# Patient Record
Sex: MALE
[De-identification: names, ages, dates, MRNs, and addresses within clinical notes are randomized per-mention and may not be internally consistent; named-entity substitution may affect disease eponyms.]

---

## 2019-04-12 ENCOUNTER — HOSPITAL ENCOUNTER (OUTPATIENT)
Dept: HOSPITAL 14 - H.OPSURG | Age: 39
Discharge: HOME | End: 2019-04-12
Attending: PODIATRIST
Payer: COMMERCIAL

## 2019-04-12 VITALS
TEMPERATURE: 97.8 F | SYSTOLIC BLOOD PRESSURE: 128 MMHG | DIASTOLIC BLOOD PRESSURE: 71 MMHG | RESPIRATION RATE: 18 BRPM | HEART RATE: 87 BPM

## 2019-04-12 VITALS — BODY MASS INDEX: 37.8 KG/M2

## 2019-04-12 VITALS — OXYGEN SATURATION: 95 %
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
## 2019-04-12 DIAGNOSIS — E66.01: ICD-10-CM

## 2019-04-12 DIAGNOSIS — I10: ICD-10-CM

## 2019-04-12 DIAGNOSIS — S86.011A: Primary | ICD-10-CM
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
## 2019-04-12 PROCEDURE — 27650 REPAIR ACHILLES TENDON: CPT

## 2019-04-12 PROCEDURE — 93005 ELECTROCARDIOGRAM TRACING: CPT

## 2019-04-12 PROCEDURE — 97530 THERAPEUTIC ACTIVITIES: CPT

## 2019-04-12 PROCEDURE — 97162 PT EVAL MOD COMPLEX 30 MIN: CPT

## 2019-04-12 RX ADMIN — HYDROMORPHONE HYDROCHLORIDE PRN MG: 1 INJECTION, SOLUTION INTRAMUSCULAR; INTRAVENOUS; SUBCUTANEOUS at 15:30

## 2019-04-12 RX ADMIN — HYDROMORPHONE HYDROCHLORIDE PRN MG: 1 INJECTION, SOLUTION INTRAMUSCULAR; INTRAVENOUS; SUBCUTANEOUS at 14:50

## 2019-04-12 RX ADMIN — HYDROMORPHONE HYDROCHLORIDE PRN MG: 1 INJECTION, SOLUTION INTRAMUSCULAR; INTRAVENOUS; SUBCUTANEOUS at 15:20

## 2019-04-12 RX ADMIN — HYDROMORPHONE HYDROCHLORIDE PRN MG: 1 INJECTION, SOLUTION INTRAMUSCULAR; INTRAVENOUS; SUBCUTANEOUS at 15:40

## 2019-04-12 NOTE — PCM.SURG1
Surgeon's Initial Post Op Note





- Surgeon's Notes


Surgeon: Dr. Spangler, DPM


Assistant: Dr. Jerman Sánchez, PGY2


Type of Anesthesia: General Endo


Anesthesia Administered By: Dr. Meadows


Pre-Operative Diagnosis: Ruptured achilles tendon, right


Operative Findings: See dictation report.  M- #2 fiberwire, 2-0 fiberwire, 4-0 

vicryl, 4-0 prolene, Arthrex Arthroflex graft


Post-Operative Diagnosis: Same


Operation Performed: Primary repair of ruptured achilles tendon, right


Specimen/Specimens Removed: None


Estimated Blood Loss: EBL {In ML}: 5


Blood Products Given: N/A


Drains Used: No Drains


Post-Op Condition: Good


Date of Surgery/Procedure: 04/12/19


Time of Surgery/Procedure: 14:40

## 2019-04-12 NOTE — CARD
--------------- APPROVED REPORT --------------





Date of service: 04/12/2019



EKG Measurement

Heart [id number]

MS [id number]

[id number]

[id number]

[id number]



<Conclusion>

Normal sinus rhythm

Inferior infarct, age undetermined

Poor R wave progression in Precordial leads

Abnormal ECG

## 2019-04-12 NOTE — CP.PCM.PN
Subjective





- Date & Time of Evaluation


Date of Evaluation: 04/12/19


Time of Evaluation: 10:53





- Subjective


Subjective: 





Podiatry progress note - Dr. Spangler





38M seen and evaluated in [facility] preoperatively for right achilles repair surgery. 

States that he was playing with his kid on a trampoline and heard a pop and felt

like his ankle cracked. Once he tried to walk he could not bear weight on his 

ankle without extreme pain. States he has been nonweightbearing on the ankle 

since his visit with Dr. Spangler. NPO confirmed. Denies n/v/f/c and has no 

other complaints.





PMHx: denies


PSHx: vasectomy


All: NKDA





Objective





- Vital Signs/Intake and Output


Vital Signs (last 24 hours): 


                                        











Temp Pulse Resp BP Pulse Ox


 


 97.9 F   87   18   137/78   95 


 


 04/12/19 10:42  04/12/19 10:42  04/12/19 10:42  04/12/19 10:42  04/12/19 10:42











- Constitutional


Appears: Non-toxic





- Head Exam


Head Exam: ATRAUMATIC





- Extremities Exam


Additional comments: 





RLE focused exam





VASC: DP and PT pulses palpable; normal vascular exam; edema at the level of the

achilles tendon


DERM: no open lesions or wounds; ecchymosis present


ORTHO: positive maxwell and savage test; pain on palpation of tendon


NEURO: gross and protective sensation intact





- Neurological Exam


Neurological Exam: Alert, Awake, Oriented x3





- Psychiatric Exam


Psychiatric exam: Normal Affect





Assessment and Plan





- Assessment and Plan (Free Text)


Assessment: 





38M with right achilles tendon rupture





Plan: 





Pt was seen and examined in [facility] 


Pt NPO status was confirmed


All pre-op testing and clearance in chart


Pt has exhausted all conservative treatment at this time and is opting for 

surgical intervention


Pt was explained procedure and post-operative course


All pt's questions were answered to satisfaction


No guarantees were made


Pt understands all risks, benefits and complications of procedure


Pt will follow-up with Dr. Spangler within 1 week of surgery

## 2019-04-12 NOTE — CP.SDSHP
Same Day Surgery [facility] & P





- History


Proposed Procedure: R achilles repair


Pre-Op Diagnosis: R achilles rupture





- Allergies


Allergies: 


Allergies





No Known Allergies Allergy (Verified 04/04/19 17:30)


   











- Physical Exam


Vital Signs: 


                                   Vital Signs











  04/12/19





  10:42


 


Temperature 97.9 F


 


Pulse Rate 87


 


Respiratory 18





Rate 


 


Blood Pressure 137/78


 


O2 Sat by Pulse 95





Oximetry 














- Date & Time


Date: 04/12/19


Time: 10:53





Short Stay Discharge





- Short Stay Discharge


Admitting Diagnosis/Reason for Visit: S86.001A


Disposition: HOME/ ROUTINE

## 2019-04-15 NOTE — OP
PROCEDURE DATE:  04/12/2019



PREOPERATIVE DIAGNOSIS:  Partial rupture of right Achilles tendon.



POSTOPERATIVE DIAGNOSIS:  Partial rupture of right Achilles tendon.



PROCEDURE PERFORMED:  Primary repair of partially ruptured right Achilles

tendon.



SURGEON:  Chuck Spangler DPM



ASSISTANT:  Jerman Sánchez DPM, PGY-2



ANESTHESIOLOGIST:  Kary Meadows MD



ANESTHESIA:  General.



INDICATIONS:  The patient is a 38-year-old male with the above-mentioned

diagnosis.  The patient is being treated by Dr. Spangler in his office on

an outpatient basis, where he has exhausted conservative treatment options.

At this time, the patient now seeks surgical intervention.  All risks,

benefits and possible complications of the proposed procedure have been

explained to the patient at length.  The patient verbalized his

understanding and wished to proceed.  All questions were answered.  No

guarantees were given nor implied.  Consent was signed and NPO was

confirmed prior to bringing the patient to the operating room.



DESCRIPTION OF PROCEDURE:  The patient was brought to the operating room

and placed on the operating table in a prone position.  A pneumatic thigh

tourniquet at a pressure setting of 350 mmHg was utilized for the entirety

of the procedure.  Once anesthesia was achieved, the foot was prepped and

draped in the usual sterile manner and the procedure was begun.



Attention was then turned to the posterior aspect of the right leg, where a

palpable dell could be felt overlying the Achilles tendon approximately 5

cm proximal to the Achilles tendon insertion site into the calcaneus. 

Utilizing a fresh #15 blade, a linear longitudinal incision was made over

the palpable dell.  Sharp and blunt dissection was utilized to carry the

dissection down to the level of paratenon with care taken to retract all

vital neurovascular structures and cauterization of all bleeders as

necessary.  Once the paratenon was identified, a linear longitudinal

incision was made through the paratenon using a sharp #15 blade.  Paratenon

was then sharply debrided of the underlying Achilles tendon and flapped

away from the tendon both medially and laterally.  At this time, the

Achilles tendon rupture was identified.  It was noted that approximately

75% of the Achilles tendon diameter was ruptured.  Using both sharp and

blunt dissection, the Achilles tendon was freed from its surrounding soft

tissue structures circumferentially in the area immediately surrounding the

rupture site.  Following this, a #2 FiberWire was utilized via a Krackow

stitch maneuver to reapproximate the distal and proximal ends of the

Achilles tendon in an anatomically appropriate position.  Following this,

the #2 FiberWire was passed from the proximal end of the Achilles tendon

rupture to the distal end of the Achilles tendon rupture and two

over-and-over sutures to further reinforce the reapproximation of the

tendon.  Next, an Arthrex ArthroFLEX graft was circumferentially wrapped

around the ruptured portion of the Achilles tendon and held in place using

2-0 FiberWire suture.  The graft was then sewn down to itself using 2-0

Vicryl suture.  Following this, the paratenon was reapproximated as closely

as could be achieved using 4-0 Vicryl suture.  The surgical site was then

flushed with copious amounts of normal sterile saline.  The deep

subcutaneous layer was reapproximated using 4-0 Vicryl suture and the skin

was reapproximated and well coapted using 4-0 Prolene suture, and the

procedure was completed.  The surgical site was then dressed with

Betadine-soaked Adaptic, 4 x 4 gauze, Kerlix, and a posterior splint was

applied with Webril padding and Ace with care taken to ensure that the

ankle joint was slightly plantar flexed to allow for decreased tension on

the Achilles tendon.



POSTOPERATIVE CONDITION:  The patient tolerated the procedure and the

anesthesia well without any apparent complications or complaints.  The

patient was escorted from the operating room to the recovery room with

vital signs stable and neurovascular structures intact.  The patient will

follow up with Dr. Spangler in his office on an outpatient basis following

discharge from the hospital.





__________________________________________

Jerman Sánchez DPM





__________________________________________

Chuck Spangler DPM





DD:  04/12/2019 19:43:15

DT:  04/13/2019 0:24:55

Job # [id number]